# Patient Record
Sex: FEMALE | Race: WHITE
[De-identification: names, ages, dates, MRNs, and addresses within clinical notes are randomized per-mention and may not be internally consistent; named-entity substitution may affect disease eponyms.]

---

## 2019-03-13 ENCOUNTER — HOSPITAL ENCOUNTER (OUTPATIENT)
Dept: HOSPITAL 74 - JASU-ENDO | Age: 79
Discharge: HOME | End: 2019-03-13
Attending: INTERNAL MEDICINE
Payer: COMMERCIAL

## 2019-03-13 VITALS — SYSTOLIC BLOOD PRESSURE: 108 MMHG | DIASTOLIC BLOOD PRESSURE: 60 MMHG | HEART RATE: 80 BPM

## 2019-03-13 VITALS — BODY MASS INDEX: 25.2 KG/M2

## 2019-03-13 VITALS — TEMPERATURE: 97.8 F

## 2019-03-13 DIAGNOSIS — Z12.11: Primary | ICD-10-CM

## 2019-03-13 DIAGNOSIS — K57.30: ICD-10-CM

## 2019-03-13 DIAGNOSIS — K64.8: ICD-10-CM

## 2019-03-13 DIAGNOSIS — Z86.010: ICD-10-CM

## 2019-03-13 PROCEDURE — 0DBP8ZX EXCISION OF RECTUM, VIA NATURAL OR ARTIFICIAL OPENING ENDOSCOPIC, DIAGNOSTIC: ICD-10-PCS | Performed by: INTERNAL MEDICINE

## 2019-03-13 PROCEDURE — 0DBK8ZX EXCISION OF ASCENDING COLON, VIA NATURAL OR ARTIFICIAL OPENING ENDOSCOPIC, DIAGNOSTIC: ICD-10-PCS | Performed by: INTERNAL MEDICINE

## 2019-03-13 PROCEDURE — 0DBL8ZX EXCISION OF TRANSVERSE COLON, VIA NATURAL OR ARTIFICIAL OPENING ENDOSCOPIC, DIAGNOSTIC: ICD-10-PCS | Performed by: INTERNAL MEDICINE

## 2019-03-14 NOTE — PATH
Surgical Pathology Report



Patient Name:  YO HACKETT

Accession #:  [id number]

Med. Rec. #:  [id number]                                                        

   /Age/Gender:  1940 (Age: 78) / F

Account:  [id number]                                                          

             Location: [facility]

Taken:  3/13/2019

Received:  3/13/2019

Reported:  3/14/2019

Physicians:  Osman Hernandez M.D.

  



Specimen(s) Received

A: RIGHT COLON POLYPS 

B: DISTAL TRANSVERSE COLON POLYP 

C: RECTAL POLYP 





Clinical History

Adenoma surveillance

Postoperative diagnosis: Colon polyps







Final Diagnosis

A. RIGHT COLON POLYPS, POLYPECTOMY:

TUBULAR ADENOMA, MULTIPLE FRAGMENTS.

SEPARATE FRAGMENTS OF HYPERPLASTIC POLYP.



B. DISTAL TRANSVERSE COLON POLYP, POLYPECTOMY:

TUBULAR ADENOMA.



C. RECTAL POLYP, POLYPECTOMY:

HYPERPLASTIC POLYP.







***Electronically Signed***

Luz Hill M.D.





Gross Description

A.  Received in formalin, labeled "polyp right colon" are 6 tan, irregular

portions of soft tissue ranging from 0.2-0.3 cm. in greatest dimension. The

specimens are submitted in toto in one cassette.



B.  Received in formalin, labeled "polyp distal transverse" are 3 tan, irregular

portions of soft tissue averaging 0.3 cm. in greatest dimension. The specimens

are submitted in toto in one cassette.



C.  Received in formalin, labeled "polyp rectum" are 2 tan, irregular portions

of soft tissue measuring 0.2 and 0.4 cm. in greatest dimension. The specimens

are submitted in toto in one cassette.

/3/13/2019



saudi/3/13/2019

## 2019-04-05 PROBLEM — Z00.00 ENCOUNTER FOR PREVENTIVE HEALTH EXAMINATION: Status: ACTIVE | Noted: 2019-04-05

## 2019-04-16 ENCOUNTER — APPOINTMENT (OUTPATIENT)
Dept: VASCULAR SURGERY | Facility: CLINIC | Age: 79
End: 2019-04-16
Payer: MEDICARE

## 2019-04-16 DIAGNOSIS — I83.90 ASYMPTOMATIC VARICOSE VEINS OF UNSPECIFIED LOWER EXTREMITY: ICD-10-CM

## 2019-04-16 DIAGNOSIS — Z86.39 PERSONAL HISTORY OF OTHER ENDOCRINE, NUTRITIONAL AND METABOLIC DISEASE: ICD-10-CM

## 2019-04-16 DIAGNOSIS — Z86.79 PERSONAL HISTORY OF OTHER DISEASES OF THE CIRCULATORY SYSTEM: ICD-10-CM

## 2019-04-16 DIAGNOSIS — I73.9 PERIPHERAL VASCULAR DISEASE, UNSPECIFIED: ICD-10-CM

## 2019-04-16 PROCEDURE — 99204 OFFICE O/P NEW MOD 45 MIN: CPT

## 2019-04-16 PROCEDURE — 93923 UPR/LXTR ART STDY 3+ LVLS: CPT

## 2019-04-16 RX ORDER — METOPROLOL SUCCINATE 25 MG/1
25 TABLET, EXTENDED RELEASE ORAL
Refills: 0 | Status: ACTIVE | COMMUNITY

## 2019-04-16 RX ORDER — ATORVASTATIN CALCIUM 10 MG/1
10 TABLET, FILM COATED ORAL
Refills: 0 | Status: ACTIVE | COMMUNITY

## 2019-04-18 NOTE — PHYSICAL EXAM
[Normal Thyroid] : the thyroid was normal [Normal Breath Sounds] : Normal breath sounds [Normal Heart Sounds] : normal heart sounds [Normal Rate and Rhythm] : normal rate and rhythm [2+] : left 2+ [1+] : right 1+ [Ankle Swelling Bilaterally] : bilaterally  [Ankle Swelling (On Exam)] : present [Varicose Veins Of Lower Extremities] : bilaterally [Ankle Swelling On The Right] : mild [No Rash or Lesion] : No rash or lesion [Alert] : alert [Calm] : calm [JVD] : no jugular venous distention  [Carotid Bruits] : no carotid bruits [Left Carotid Bruit] : no bruit heard over the left carotid [] : not present [Right Carotid Bruit] : no bruit heard over the right carotid [de-identified] : NC/AT, anicteric [de-identified] : Well-nourished, NAD [de-identified] : FROM throughout, strength 5/5x4, no palpable cords, neg Monica's sign [de-identified] : SNTND

## 2019-04-18 NOTE — HISTORY OF PRESENT ILLNESS
[FreeTextEntry1] : 78yoF w/h/o HTN/HLD/varicose veins/chronic OA s/p b/l knee injections, presents w/family for evaluation of her chronic 8/10 R posterior thigh/posterior knee/calf pain that began 6mo prior.  Pt denies any trauma or fall at that time, and states that the pain is worst when she has been seated for a long time and tries to stand up.\par \par Pt denies smoking hx, and denies any previous h/o DVT/SVT.  She is to be scheduled for repeat knee injections and possible TKRs in the future.

## 2019-04-18 NOTE — REVIEW OF SYSTEMS
[Leg Claudication] : intermittent leg claudication [Lower Ext Edema] : lower extremity edema [Joint Pain] : joint pain [Joint Swelling] : joint swelling [Joint Stiffness] : joint stiffness [Limb Pain] : limb pain [Negative] : Heme/Lymph

## 2019-04-18 NOTE — ASSESSMENT
[FreeTextEntry1] : 78yoF w/h/o OA which will require intervention for her chronic knee pain, presenting today for evaluation of her circulation in the setting of 6mo h/o R posterior thigh/pop fossa/calf pain.  KEIRA/PVRs do not demonstrate evidence of significant arterial insufficiency, suggesting that pt's pain is musculoskeletal.  Pt cleared from a vascular standpoint for any orthopedic procedures indicated on the LEs.\par \par I personally discussed this patient with the Physician Assistant at the time of the visit. I agree with the assessment and plan as written

## 2019-04-18 NOTE — REASON FOR VISIT
[Consultation] : a consultation visit [Family Member] : family member [FreeTextEntry1] : Posterior thigh/knee/calf pain

## 2023-03-06 ENCOUNTER — OFFICE (OUTPATIENT)
Dept: URBAN - METROPOLITAN AREA CLINIC 121 | Facility: CLINIC | Age: 83
Setting detail: OPHTHALMOLOGY
End: 2023-03-06
Payer: COMMERCIAL

## 2023-03-06 DIAGNOSIS — H40.023: ICD-10-CM

## 2023-03-06 PROCEDURE — 92083 EXTENDED VISUAL FIELD XM: CPT | Performed by: OPHTHALMOLOGY

## 2023-03-06 PROCEDURE — 99212 OFFICE O/P EST SF 10 MIN: CPT | Performed by: OPHTHALMOLOGY

## 2023-03-06 PROCEDURE — 92133 CPTRZD OPH DX IMG PST SGM ON: CPT | Performed by: OPHTHALMOLOGY

## 2023-03-06 ASSESSMENT — KERATOMETRY
OS_K2POWER_DIOPTERS: 44.50
OD_AXISANGLE_DEGREES: 097
OD_K1POWER_DIOPTERS: 44.00
OS_K1POWER_DIOPTERS: 44.00
OS_AXISANGLE_DEGREES: 057
OD_K2POWER_DIOPTERS: 45.00
METHOD_AUTO_MANUAL: AUTO

## 2023-03-06 ASSESSMENT — REFRACTION_AUTOREFRACTION
OS_CYLINDER: +1.25
OD_AXIS: 120
OD_SPHERE: -0.75
OS_SPHERE: -1.25
OS_AXIS: 024
OD_CYLINDER: +1.00

## 2023-03-06 ASSESSMENT — AXIALLENGTH_DERIVED
OS_AL: 23.56
OD_AL: 23.325

## 2023-03-06 ASSESSMENT — VISUAL ACUITY
OD_BCVA: 20/40
OS_BCVA: 20/30

## 2023-03-06 ASSESSMENT — SUPERFICIAL PUNCTATE KERATITIS (SPK)
OS_SPK: T
OD_SPK: T

## 2023-03-06 ASSESSMENT — CONFRONTATIONAL VISUAL FIELD TEST (CVF)
OD_FINDINGS: FULL
OS_FINDINGS: FULL

## 2023-03-06 ASSESSMENT — SPHEQUIV_DERIVED
OS_SPHEQUIV: -0.625
OD_SPHEQUIV: -0.25

## 2023-04-19 ENCOUNTER — HOSPITAL ENCOUNTER (OUTPATIENT)
Dept: HOSPITAL 74 - JASU-ENDO | Age: 83
Discharge: HOME | End: 2023-04-19
Attending: INTERNAL MEDICINE
Payer: COMMERCIAL

## 2023-04-19 VITALS — HEART RATE: 78 BPM | DIASTOLIC BLOOD PRESSURE: 59 MMHG | RESPIRATION RATE: 18 BRPM | SYSTOLIC BLOOD PRESSURE: 118 MMHG

## 2023-04-19 VITALS — BODY MASS INDEX: 27.1 KG/M2

## 2023-04-19 VITALS — TEMPERATURE: 97.8 F

## 2023-04-19 DIAGNOSIS — K57.30: ICD-10-CM

## 2023-04-19 DIAGNOSIS — Z12.11: Primary | ICD-10-CM

## 2023-04-19 DIAGNOSIS — I10: ICD-10-CM

## 2023-04-19 DIAGNOSIS — Z86.010: ICD-10-CM

## 2023-04-19 DIAGNOSIS — K64.8: ICD-10-CM

## 2023-04-19 PROCEDURE — 0DJD8ZZ INSPECTION OF LOWER INTESTINAL TRACT, VIA NATURAL OR ARTIFICIAL OPENING ENDOSCOPIC: ICD-10-PCS | Performed by: INTERNAL MEDICINE

## 2023-09-06 ENCOUNTER — OFFICE (OUTPATIENT)
Dept: URBAN - METROPOLITAN AREA CLINIC 121 | Facility: CLINIC | Age: 83
Setting detail: OPHTHALMOLOGY
End: 2023-09-06
Payer: COMMERCIAL

## 2023-09-06 DIAGNOSIS — H16.223: ICD-10-CM

## 2023-09-06 DIAGNOSIS — H35.363: ICD-10-CM

## 2023-09-06 DIAGNOSIS — H25.13: ICD-10-CM

## 2023-09-06 DIAGNOSIS — H43.813: ICD-10-CM

## 2023-09-06 DIAGNOSIS — H35.033: ICD-10-CM

## 2023-09-06 DIAGNOSIS — H43.393: ICD-10-CM

## 2023-09-06 DIAGNOSIS — H40.023: ICD-10-CM

## 2023-09-06 DIAGNOSIS — H35.3131: ICD-10-CM

## 2023-09-06 PROCEDURE — 92014 COMPRE OPH EXAM EST PT 1/>: CPT | Performed by: OPHTHALMOLOGY

## 2023-09-06 PROCEDURE — 92250 FUNDUS PHOTOGRAPHY W/I&R: CPT | Performed by: OPHTHALMOLOGY

## 2023-09-06 ASSESSMENT — SUPERFICIAL PUNCTATE KERATITIS (SPK)
OD_SPK: T
OS_SPK: T

## 2023-09-06 ASSESSMENT — SPHEQUIV_DERIVED
OD_SPHEQUIV: -0.625
OS_SPHEQUIV: -0.75

## 2023-09-06 ASSESSMENT — REFRACTION_CURRENTRX
OD_OVR_VA: 20/
OD_SPHERE: +2.00
OS_SPHERE: +2.00
OS_OVR_VA: 20/

## 2023-09-06 ASSESSMENT — REFRACTION_AUTOREFRACTION
OD_AXIS: 124
OS_SPHERE: -1.50
OS_CYLINDER: +1.50
OD_CYLINDER: +0.75
OS_AXIS: 031
OD_SPHERE: -1.00

## 2023-09-06 ASSESSMENT — KERATOMETRY
METHOD_AUTO_MANUAL: AUTO
OD_AXISANGLE_DEGREES: 097
OS_AXISANGLE_DEGREES: 047
OD_K2POWER_DIOPTERS: 44.75
OD_K1POWER_DIOPTERS: 44.25
OS_K1POWER_DIOPTERS: 44.00
OS_K2POWER_DIOPTERS: 44.50

## 2023-09-06 ASSESSMENT — PACHYMETRY
OD_CT_CORRECTION: 1
OS_CT_CORRECTION: 1
OD_CT_UM: 529
OS_CT_UM: 538

## 2023-09-06 ASSESSMENT — AXIALLENGTH_DERIVED
OS_AL: 23.6086
OD_AL: 23.4687

## 2023-09-06 ASSESSMENT — TONOMETRY
OS_IOP_MMHG: 14
OD_IOP_MMHG: 14

## 2023-09-06 ASSESSMENT — VISUAL ACUITY
OS_BCVA: 20/30
OD_BCVA: 20/40

## 2023-09-06 ASSESSMENT — CONFRONTATIONAL VISUAL FIELD TEST (CVF)
OD_FINDINGS: FULL
OS_FINDINGS: FULL

## 2024-03-11 ENCOUNTER — OFFICE (OUTPATIENT)
Dept: URBAN - METROPOLITAN AREA CLINIC 121 | Facility: CLINIC | Age: 84
Setting detail: OPHTHALMOLOGY
End: 2024-03-11
Payer: COMMERCIAL

## 2024-03-11 DIAGNOSIS — H43.393: ICD-10-CM

## 2024-03-11 DIAGNOSIS — H40.023: ICD-10-CM

## 2024-03-11 DIAGNOSIS — H43.813: ICD-10-CM

## 2024-03-11 DIAGNOSIS — H35.033: ICD-10-CM

## 2024-03-11 DIAGNOSIS — H25.13: ICD-10-CM

## 2024-03-11 DIAGNOSIS — H35.363: ICD-10-CM

## 2024-03-11 DIAGNOSIS — H35.3131: ICD-10-CM

## 2024-03-11 DIAGNOSIS — H16.223: ICD-10-CM

## 2024-03-11 PROCEDURE — 92014 COMPRE OPH EXAM EST PT 1/>: CPT | Performed by: OPHTHALMOLOGY

## 2024-03-11 PROCEDURE — 92250 FUNDUS PHOTOGRAPHY W/I&R: CPT | Performed by: OPHTHALMOLOGY

## 2024-03-11 ASSESSMENT — REFRACTION_CURRENTRX
OS_OVR_VA: 20/
OD_OVR_VA: 20/
OD_SPHERE: +2.00
OS_SPHERE: +2.00

## 2024-09-16 ENCOUNTER — OFFICE (OUTPATIENT)
Dept: URBAN - METROPOLITAN AREA CLINIC 121 | Facility: CLINIC | Age: 84
Setting detail: OPHTHALMOLOGY
End: 2024-09-16
Payer: COMMERCIAL

## 2024-09-16 DIAGNOSIS — H40.023: ICD-10-CM

## 2024-09-16 PROCEDURE — 99212 OFFICE O/P EST SF 10 MIN: CPT | Performed by: OPHTHALMOLOGY

## 2024-09-16 PROCEDURE — 92083 EXTENDED VISUAL FIELD XM: CPT | Performed by: OPHTHALMOLOGY

## 2024-09-16 PROCEDURE — 92133 CPTRZD OPH DX IMG PST SGM ON: CPT | Performed by: OPHTHALMOLOGY

## 2024-09-16 ASSESSMENT — CONFRONTATIONAL VISUAL FIELD TEST (CVF)
OD_FINDINGS: FULL
OS_FINDINGS: FULL

## 2025-06-06 ENCOUNTER — OFFICE (OUTPATIENT)
Facility: LOCATION | Age: 85
Setting detail: OPHTHALMOLOGY
End: 2025-06-06
Payer: COMMERCIAL

## 2025-06-06 DIAGNOSIS — H43.813: ICD-10-CM

## 2025-06-06 DIAGNOSIS — H25.13: ICD-10-CM

## 2025-06-06 DIAGNOSIS — H35.3131: ICD-10-CM

## 2025-06-06 DIAGNOSIS — H40.023: ICD-10-CM

## 2025-06-06 PROCEDURE — 92014 COMPRE OPH EXAM EST PT 1/>: CPT | Performed by: OPHTHALMOLOGY

## 2025-06-06 PROCEDURE — 92250 FUNDUS PHOTOGRAPHY W/I&R: CPT | Performed by: OPHTHALMOLOGY

## 2025-06-06 ASSESSMENT — CONFRONTATIONAL VISUAL FIELD TEST (CVF)
OS_FINDINGS: FULL
OD_FINDINGS: FULL

## 2025-06-06 ASSESSMENT — VISUAL ACUITY
OD_BCVA: 20/50
OS_BCVA: 20/70

## 2025-06-06 ASSESSMENT — PACHYMETRY
OD_CT_UM: 529
OS_CT_UM: 538
OD_CT_CORRECTION: 1
OS_CT_CORRECTION: 1

## 2025-06-06 ASSESSMENT — KERATOMETRY
OS_K2POWER_DIOPTERS: 44.25
OD_AXISANGLE_DEGREES: 094
OD_K1POWER_DIOPTERS: 44.00
METHOD_AUTO_MANUAL: AUTO
OS_K1POWER_DIOPTERS: 44.00
OD_K2POWER_DIOPTERS: 45.00
OS_AXISANGLE_DEGREES: 042

## 2025-06-06 ASSESSMENT — SUPERFICIAL PUNCTATE KERATITIS (SPK)
OD_SPK: T
OS_SPK: T

## 2025-06-06 ASSESSMENT — REFRACTION_CURRENTRX
OD_SPHERE: +2.00
OS_OVR_VA: 20/
OS_SPHERE: +2.00
OD_OVR_VA: 20/

## 2025-06-06 ASSESSMENT — REFRACTION_AUTOREFRACTION
OS_AXIS: 020
OD_SPHERE: -1.75
OS_SPHERE: -2.00
OD_AXIS: 134
OS_CYLINDER: +1.75
OD_CYLINDER: +0.50

## 2025-06-06 ASSESSMENT — TONOMETRY
OD_IOP_MMHG: 19
OS_IOP_MMHG: 19